# Patient Record
Sex: FEMALE | Race: WHITE | NOT HISPANIC OR LATINO | Employment: STUDENT | URBAN - METROPOLITAN AREA
[De-identification: names, ages, dates, MRNs, and addresses within clinical notes are randomized per-mention and may not be internally consistent; named-entity substitution may affect disease eponyms.]

---
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
## 2022-06-02 ENCOUNTER — VIRTUAL VISIT (OUTPATIENT)
Dept: FAMILY MEDICINE | Facility: CLINIC | Age: 22
End: 2022-06-02
Payer: COMMERCIAL

## 2022-06-02 DIAGNOSIS — N94.6 DYSMENORRHEA: ICD-10-CM

## 2022-06-02 DIAGNOSIS — F43.23 ADJUSTMENT DISORDER WITH MIXED ANXIETY AND DEPRESSED MOOD: Primary | ICD-10-CM

## 2022-06-02 PROCEDURE — 99213 OFFICE O/P EST LOW 20 MIN: CPT | Mod: 95 | Performed by: FAMILY MEDICINE

## 2022-06-02 RX ORDER — LEVONORGESTREL/ETHIN.ESTRADIOL 0.1-0.02MG
1 TABLET ORAL DAILY
Qty: 84 TABLET | Refills: 3 | Status: SHIPPED | OUTPATIENT
Start: 2022-06-02 | End: 2023-06-06

## 2022-06-02 ASSESSMENT — ANXIETY QUESTIONNAIRES
4. TROUBLE RELAXING: NOT AT ALL
1. FEELING NERVOUS, ANXIOUS, OR ON EDGE: NOT AT ALL
2. NOT BEING ABLE TO STOP OR CONTROL WORRYING: NOT AT ALL
GAD7 TOTAL SCORE: 0
3. WORRYING TOO MUCH ABOUT DIFFERENT THINGS: NOT AT ALL
7. FEELING AFRAID AS IF SOMETHING AWFUL MIGHT HAPPEN: NOT AT ALL
5. BEING SO RESTLESS THAT IT IS HARD TO SIT STILL: NOT AT ALL
6. BECOMING EASILY ANNOYED OR IRRITABLE: NOT AT ALL
GAD7 TOTAL SCORE: 0
GAD7 TOTAL SCORE: 0
7. FEELING AFRAID AS IF SOMETHING AWFUL MIGHT HAPPEN: NOT AT ALL
8. IF YOU CHECKED OFF ANY PROBLEMS, HOW DIFFICULT HAVE THESE MADE IT FOR YOU TO DO YOUR WORK, TAKE CARE OF THINGS AT HOME, OR GET ALONG WITH OTHER PEOPLE?: NOT DIFFICULT AT ALL

## 2022-06-02 ASSESSMENT — PATIENT HEALTH QUESTIONNAIRE - PHQ9
SUM OF ALL RESPONSES TO PHQ QUESTIONS 1-9: 1
10. IF YOU CHECKED OFF ANY PROBLEMS, HOW DIFFICULT HAVE THESE PROBLEMS MADE IT FOR YOU TO DO YOUR WORK, TAKE CARE OF THINGS AT HOME, OR GET ALONG WITH OTHER PEOPLE: NOT DIFFICULT AT ALL
SUM OF ALL RESPONSES TO PHQ QUESTIONS 1-9: 1

## 2022-06-02 NOTE — PROGRESS NOTES
Georgia is a 21 year old who is being evaluated via a billable video visit.      How would you like to obtain your AVS? MyChart  If the video visit is dropped, the invitation should be resent by: Text to cell phone: [phone]  Will anyone else be joining your video visit? No       Video Start Time: 9:58 AM    Assessment & Plan     Adjustment disorder with mixed anxiety and depressed mood - stable, refills  - FLUoxetine (PROZAC) 20 MG capsule; Take 1 capsule (20 mg) by mouth daily    Dysmenorrhea - stable, refills  - levonorgestrel-ethinyl estradiol (AVIANE) 0.1-20 MG-MCG tablet; Take 1 tablet by mouth daily    Return in about 1 year (around 6/2/2023) for Medication Recheck, Yearly Preventive Exam.    Jazz Zuniga MD  [facility]   Georgia is a 21 year old who presents for the following health issues     Contraception    History of Present Illness       Reason for visit:  Renew medication    She eats 2-3 servings of fruits and vegetables daily.She consumes 0 sweetened beverage(s) daily.She exercises with enough effort to increase her heart rate 20 to 29 minutes per day.  She exercises with enough effort to increase her heart rate 5 days per week.   She is taking medications regularly.    Today's PHQ-9         PHQ-9 Total Score: 1    PHQ-9 Q9 Thoughts of better off dead/self-harm past 2 weeks :   Not at all    How difficult have these problems made it for you to do your work, take care of things at home, or get along with other people: Not difficult at all  Today's SIRIA-7 Score: 0       Depression Followup    How are you doing with your depression since your last visit? Improved     Are you having other symptoms that might be associated with depression? No    Have you had a significant life event?  No     Are you feeling anxious or having panic attacks?   No    Do you have any concerns with your use of alcohol or other drugs? No    Social History     Tobacco Use      Smoking status: Never Smoker     Smokeless tobacco: Never Used     Tobacco comment: no smokers in the home   Vaping Use     Vaping Use: Never used   Substance Use Topics     Alcohol use: No     Drug use: No     PHQ 6/18/2019 3/9/2020 6/2/2022   PHQ-9 Total Score - 6 1   Q9: Thoughts of better off dead/self-harm past 2 weeks - Not at all Not at all   PHQ-A Total Score 9 - -   PHQ-A Depressed most days in past year Yes - -   PHQ-A Mood affect on daily activities Somewhat difficult - -   PHQ-A Suicide Ideation past 2 weeks Not at all - -   PHQ-A Suicide Ideation past month No - -   PHQ-A Previous suicide attempt No - -     SIRIA-7 SCORE 4/4/2019 3/9/2020 6/2/2022   Total Score - - 0 (minimal anxiety)   Total Score 12 4 0     Last PHQ-9 6/2/2022   1.  Little interest or pleasure in doing things 0   2.  Feeling down, depressed, or hopeless 0   3.  Trouble falling or staying asleep, or sleeping too much 0   4.  Feeling tired or having little energy 0   5.  Poor appetite or overeating 0   6.  Feeling bad about yourself 0   7.  Trouble concentrating 1   8.  Moving slowly or restless 0   Q9: Thoughts of better off dead/self-harm past 2 weeks 0   PHQ-9 Total Score 1   Difficulty at work, home, or with people -     SIRIA-7  6/2/2022   1. Feeling nervous, anxious, or on edge 0   2. Not being able to stop or control worrying 0   3. Worrying too much about different things 0   4. Trouble relaxing 0   5. Being so restless that it is hard to sit still 0   6. Becoming easily annoyed or irritable 0   7. Feeling afraid, as if something awful might happen 0   SIRIA-7 Total Score 0   If you checked any problems, how difficult have they made it for you to do your work, take care of things at home, or get along with other people? -       On Prozac 20 mg daily. Working well for her, would like to continue.     Also taking OCP, working fine, would like to continue.       Review of Systems   Constitutional, HEENT, cardiovascular, pulmonary, gi and  "gu systems are negative, except as otherwise noted.      Objective    Vitals - Patient Reported  Weight (Patient Reported): 73.9 kg (163 lb)  Height (Patient Reported): 177.8 cm (5' 10\")  BMI (Based on Pt Reported Ht/Wt): 23.39  Vitals:  No vitals were obtained today due to virtual visit.    Physical Exam   GENERAL: Healthy, alert and no distress  EYES: Eyes grossly normal to inspection.  No discharge or erythema, or obvious scleral/conjunctival abnormalities.  RESP: No audible wheeze, cough, or visible cyanosis.  No visible retractions or increased work of breathing.    SKIN: Visible skin clear. No significant rash, abnormal pigmentation or lesions.  NEURO: Cranial nerves grossly intact.  Mentation and speech appropriate for age.  PSYCH: Mentation appears normal, affect normal/bright, judgement and insight intact, normal speech and appearance well-groomed.    PHQ 6/18/2019 3/9/2020 6/2/2022   PHQ-9 Total Score - 6 1   Q9: Thoughts of better off dead/self-harm past 2 weeks - Not at all Not at all   PHQ-A Total Score 9 - -   PHQ-A Depressed most days in past year Yes - -   PHQ-A Mood affect on daily activities Somewhat difficult - -   PHQ-A Suicide Ideation past 2 weeks Not at all - -   PHQ-A Suicide Ideation past month No - -   PHQ-A Previous suicide attempt No - -     SIRIA-7 SCORE 4/4/2019 3/9/2020 6/2/2022   Total Score - - 0 (minimal anxiety)   Total Score 12 4 0           Video-Visit Details    Type of service:  Video Visit    Video End Time:10:01 AM    Originating Location (pt. Location): Home    Distant Location (provider location):  [facility]     Platform used for Video Visit: Jona"

## 2022-08-21 ENCOUNTER — HEALTH MAINTENANCE LETTER (OUTPATIENT)
Age: 22
End: 2022-08-21

## 2022-11-20 ENCOUNTER — HEALTH MAINTENANCE LETTER (OUTPATIENT)
Age: 22
End: 2022-11-20

## 2022-12-15 ENCOUNTER — TELEPHONE (OUTPATIENT)
Dept: FAMILY MEDICINE | Facility: CLINIC | Age: 22
End: 2022-12-15

## 2022-12-15 NOTE — TELEPHONE ENCOUNTER
Patient Quality Outreach    Patient is due for the following:   Physical Preventive Adult Physical  Chlamydia Screening      Topic Date Due     COVID-19 Vaccine (4 - Booster for Pfizer series) 12/15/2021     Flu Vaccine (1) 09/01/2022       Next Steps:   Schedule a Adult Preventative    Type of outreach:    Phone, left message for patient/parent to call back. and Sent letter.    Next Steps:  Reach out within 90 days via Phone and Letter.    Max number of attempts reached: Yes. Will try again in 90 days if patient still on fail list.    Questions for provider review:    None     Damaris Roberson MA  Chart routed to Care Team.

## 2022-12-15 NOTE — LETTER
[facility]  [address] [facility] [address]  [phone]  December 15, 2022    Georgia Lezama  [address]  [facility] [address]    Dear Georgia,    I care about your health and have reviewed your health plan. I have reviewed your medical conditions, medication list, and lab results and am making recommendations based on this review, to better manage your health.    You are in particular need of attention regarding:  -Wellness (Physical) Visit   -Chlamydia Screening    I am recommending that you:  {recommendations:-schedule a WELLNESS (Physical) APPOINTMENT with me.   I will check fasting labs the same day - nothing to eat except water and meds for 8-10 hours prior.    Here is a list of Health Maintenance topics that are due now or due soon:  Health Maintenance Due   Topic Date Due     CHLAMYDIA SCREENING  Never done     COVID-19 Vaccine (4 - Booster for Pfizer series) 12/15/2021     YEARLY PREVENTIVE VISIT  06/04/2022     ANNUAL REVIEW OF HM ORDERS  06/04/2022     INFLUENZA VACCINE (1) 09/01/2022       Please call us at [phone] (or use [employer]) to address the above recommendations.     Thank you for trusting [facility] and we appreciate the opportunity to serve you.  We look forward to supporting your healthcare needs in the future.    Healthy Regards,    [facility] Care Team

## 2023-05-05 ENCOUNTER — TELEPHONE (OUTPATIENT)
Dept: FAMILY MEDICINE | Facility: CLINIC | Age: 23
End: 2023-05-05
Payer: COMMERCIAL

## 2023-05-05 NOTE — TELEPHONE ENCOUNTER
Summary:    Patient is due/failing the following:   Chlamydia screening    Reviewed:    [] CARE EVERYWHERE  [] LAST OV NOTE   [] FYI TAB  [] MYCHART ACTIVE?  [] LAST PANEL ENCOUNTER  [] FUTURE APPTS  [] IMMUNIZATIONS  [] Media Tab            Action needed:   Patient needs non-fasting lab only appointment    Type of outreach:    per chart declined screening                                                                                Lulu Hanks/YADI  [place]---[facility]

## 2023-06-06 DIAGNOSIS — N94.6 DYSMENORRHEA: ICD-10-CM

## 2023-06-06 RX ORDER — LEVONORGESTREL/ETHIN.ESTRADIOL 0.1-0.02MG
1 TABLET ORAL DAILY
Qty: 30 TABLET | Refills: 0 | Status: SHIPPED | OUTPATIENT
Start: 2023-06-06 | End: 2023-06-22

## 2023-06-06 NOTE — TELEPHONE ENCOUNTER
Routing refill request to provider for review/approval because:  Allergy warning pt has OV with OB next month ok for 1 X fill?  Ainsley Guadarrama, RN

## 2023-06-14 DIAGNOSIS — F43.23 ADJUSTMENT DISORDER WITH MIXED ANXIETY AND DEPRESSED MOOD: ICD-10-CM

## 2023-06-14 NOTE — TELEPHONE ENCOUNTER
Routing refill request to provider for review/approval because:  Labs not current:    Allergy to red dye    PHQ-9 score:        6/2/2022     9:26 AM   PHQ   PHQ-9 Total Score 1   Q9: Thoughts of better off dead/self-harm past 2 weeks Not at all     Team, please call and schedule pt for yearly OV with provider.    Olivia BRITO RN

## 2023-06-22 ENCOUNTER — OFFICE VISIT (OUTPATIENT)
Dept: OBGYN | Facility: CLINIC | Age: 23
End: 2023-06-22
Payer: COMMERCIAL

## 2023-06-22 VITALS
HEIGHT: 70 IN | WEIGHT: 182.5 LBS | BODY MASS INDEX: 26.13 KG/M2 | DIASTOLIC BLOOD PRESSURE: 60 MMHG | SYSTOLIC BLOOD PRESSURE: 110 MMHG

## 2023-06-22 DIAGNOSIS — N94.6 DYSMENORRHEA: ICD-10-CM

## 2023-06-22 DIAGNOSIS — Z12.4 PAPANICOLAOU SMEAR FOR CERVICAL CANCER SCREENING: ICD-10-CM

## 2023-06-22 DIAGNOSIS — F43.23 ADJUSTMENT DISORDER WITH MIXED ANXIETY AND DEPRESSED MOOD: ICD-10-CM

## 2023-06-22 DIAGNOSIS — N89.8 VAGINAL DISCHARGE: Primary | ICD-10-CM

## 2023-06-22 LAB
CLUE CELLS: ABNORMAL
TRICHOMONAS, WET PREP: ABNORMAL
WBC'S/HIGH POWER FIELD, WET PREP: ABNORMAL
YEAST, WET PREP: ABNORMAL

## 2023-06-22 PROCEDURE — 99385 PREV VISIT NEW AGE 18-39: CPT | Performed by: FAMILY MEDICINE

## 2023-06-22 PROCEDURE — 87210 SMEAR WET MOUNT SALINE/INK: CPT | Performed by: FAMILY MEDICINE

## 2023-06-22 PROCEDURE — G0145 SCR C/V CYTO,THINLAYER,RESCR: HCPCS | Performed by: FAMILY MEDICINE

## 2023-06-22 RX ORDER — LEVONORGESTREL/ETHIN.ESTRADIOL 0.1-0.02MG
1 TABLET ORAL DAILY
Qty: 30 TABLET | Refills: 3 | Status: SHIPPED | OUTPATIENT
Start: 2023-06-22 | End: 2023-08-16

## 2023-06-22 NOTE — PATIENT INSTRUCTIONS
Return yearly   If you continue to have pain, I can refer you to physical therapy  Many women are finding vaginal dilators to be an effective medical aid in helping them overcome sexual problems, such as, vaginal atrophy, painful intercourse, and fear of penetration. These durable plastic, tube-shaped devices come in four progressive sizes: extra-small, small, medium and large. They allow you to slowly stretch the muscles in your vagina and desensitize your emotional and physical reactions. Over time you can build confidence about your ability to enjoy vaginal penetration during lovemaking.  Here are some general suggestions for how to use vaginal dilators in a sexual healing program. Consult with your physician or sex therapist to make sure that these suggestions conform with the individual treatment program recommended for you.  Suggestions for inserting vaginal dilators:  Wash the smallest size dilator with soap and hot water. Then place it in a glass of hot water by your bedside to keep it warm. Take a hot, relaxing bath. Dry off and sit back on your bed, reclining on soft pillows.   Take the warm dilator, dry it off, and cover the tip with a personal lubricant. Place a large dab of lubricant over your vaginal opening.   Relax, breathe fully and steadily. Do a few Kegel exercises to tighten and relax your vaginal opening. When you feel ready, slowly insert the tip of the dilator into the opening of your vagina. Angle the tip downwards, towards your tailbone, as this will help steer the dilator comfortably in, under and around your pubic bone. Insert the dilator only as far as you want to, then rest and continue with relaxation techniques to keep yourself calm and your vaginal muscles loose.   Work towards being able to insert the dilator 3-4 inches inside you and rest it there for about 20 minutes a day, every day. The more regularly and routinely you do the insertion, the easier it will become. Do not try to  push the dilator in all the way. It is designed so that part of it will stick out so you can easily hold on to it.   Once you are comfortable with resting the smallest size dilator inside you, experiment with moving it around. Remember, the dilator is helping you slowly stretch the inner muscles of your vagina. Move the dilator up and down, back and forth.   Repeat the exercise with the smallest size dilator for at least one week. When you feel ready, progress to the next size dilator. Stay with one size dilator as long as you need to until you feel relaxed and comfortable with insertion and movement. You master each size before moving on to the next.   Continue the exercises until you have mastered the largest size dilator. Then, you can repeat the exercises guiding your partner s hand as your partner inserts the dilators and moves them around. Eventually, with your partner s cooperation, you can continue the exercises using your partner s penis, or fingers.   Using vaginal dilators takes time and commitment. To protect against boredom, feel free to combine dilator insertion with another relaxing activity, such as, reading a book, watching TV, or listening to music. If you can only find 10 minutes a day to do the exercises, that s better than nothing. If you skip a day or several days, just start in again as soon as possible. One woman likened the process of treatment with a dilator to using a shoe stretcher to increase the size of tight shoes -- with either one, you need to get it in and give it plenty of time to work.  In using vaginal dilators, remember that you can control the process. You determine when, where, how, and for how long you will do an exercise. To facilitate transitions, you may want to shift down to an easier size for a longer period of time, or shift from one size to another slowly during the same 20 minute period. Treatment with vaginal dilators usually takes several months if a woman is using  the dilators daily. Follow-up insertion on a less frequent basis may be necessary to keep the vaginal muscles loosened.  Sexual relations:  It is best to avoid sexual activity that involves any type of vaginal penetration during the dilator treatment period. Non-vaginal penetration sexual activities, such as oral sex and manual stimulation, are fine.  The transaction from plastic dilators to a partner s penis is often an exciting step for a couple. To make the transition, your partner has to learn a passive role, letting you control the insertion and then just resting inside the vagina for a while. In time you can expand this exercise to permit insertion by the male of his own penis, clitoral stimulation, some thrusting, and experimentation with different position.  Vaginal dilators allow you to get used to sensations common in intercourse. Keep in mind that intercourse is not always 100% comfortable. Little temporary tugs and pressures are often just part of getting started. If some minor discomfort exists, try moving ahead anyway -- but if obvious pain persists, don t ignore it , stop. If you encounter unexpected difficulty, you may want to practice with the dilators some more before attempting intercourse again. Continued dilator use may be necessary, from time to time, to keep the vaginal area relaxed and comfortable.  Vaginal dilators can be very successful in helping women overcome penetration problems, but if old symptoms reoccur, don t hesitate to consult your physician.

## 2023-06-22 NOTE — PROGRESS NOTES
SUBJECTIVE:  Georgia Lezama is an 22 year old  woman who presents for   annual gyn exam. Patient's last menstrual period was 2023 (exact date). Periods are regular q 28-30 days, lasting   3-4 days. Dysmenorrhea:mild, occurring premenstrually and first 1-2 days of flow. Cyclic symptoms   include none. No intermenstrual bleeding,   spotting, or discharge.  Menarche age teenager.  STD hx: none.    Current contraception: oral contraceptives  LUIS exposure: no  History of abnormal Pap smear: No  Family history of uterine or ovarian cancer: No  Regular self breast exam: Yes  History of abnormal mammogram: No  Family history of breast cancer: No  History of abnormal lipids: No    Past Medical History:   Diagnosis Date     Congenital branchial cleft cyst     s/p removal in         Family History   Problem Relation Age of Onset     Family History Negative Mother      Family History Negative Father      Family History Negative Brother         1     Prostate Cancer Maternal Grandfather         hyper tension     Breast Cancer Paternal Grandmother         uterine     Diabetes Maternal Aunt      Diabetes Other         second cousin       Past Surgical History:   Procedure Laterality Date     LAPAROSCOPIC APPENDECTOMY CHILD  3/16/2012    Procedure:LAPAROSCOPIC APPENDECTOMY CHILD; Surgeon:ROGELIO STARK; Location:[facility]     wisdom teeth       ZZHC EXCISION BRACH CLFT CYST,SUPERFICIAL         Current Outpatient Medications   Medication     FLUoxetine (PROZAC) 20 MG capsule     lactobacillus rhamnosus, GG, (CULTURELL) capsule     levonorgestrel-ethinyl estradiol (AVIANE) 0.1-20 MG-MCG tablet     Multiple Vitamin (MULTI-VITAMIN) per tablet     No current facility-administered medications for this visit.     Allergies   Allergen Reactions     Red Dye      Nausea, stomach issues       Social History     Tobacco Use     Smoking status: Never     Smokeless tobacco: Never     Tobacco comments:     no smokers in  "the home   Substance Use Topics     Alcohol use: No       Review Of Systems  Ears/Nose/Throat: negative  Respiratory: No shortness of breath, dyspnea on exertion, cough, or hemoptysis  Cardiovascular: negative  Gastrointestinal: negative  Genitourinary: See HPI   Constitutional, HEENT, cardiovascular, pulmonary, GI, , musculoskeletal, neuro, skin, endocrine and psych systems are negative, except as otherwise noted.      OBJECTIVE:  /60   Ht 1.778 m (5' 10\")   Wt 82.8 kg (182 lb 8 oz)   LMP 2023 (Exact Date)   BMI 26.19 kg/m      General appearance: healthy, alert and no distress  Skin: Skin color, texture, turgor normal. No rashes or lesions.  Ears: negative  Nose/Sinuses: Nares normal. Septum midline. Mucosa normal. No drainage or sinus tenderness.  Oropharynx: Lips, mucosa, and tongue normal. Teeth and gums normal.  Neck: Neck supple. No adenopathy. Thyroid symmetric, normal size,, Carotids without bruits.  Lungs: negative, Percussion normal. Good diaphragmatic excursion. Lungs clear  Heart: negative, PMI normal. No lifts, heaves, or thrills. RRR. No murmurs, clicks gallops or rub  Breasts: Inspection negative. No nipple discharge or bleeding. No masses.  Abdomen: Abdomen soft, non-tender. BS normal. No masses, organomegaly  Pelvic: Pelvic:  Pelvic examination with no pap/no Gonorrhea and Chlamydia /wet prep   including  External genitalia normal   and vagina normal rugatted not atrophic  Examination of urethra  normal no masses, tenderness, scarring  bladder, no masses or tenderness  Cervix  no lesions or discharge    Bimanual exam with   Uterus 6 weeks size, mid position, mobile,non-tenderness, normal no descent   Adnexa/parametria  Normal no masses       ASSESSMENT:  Georgia Lezama is an 22 year old  woman who presents for   annual gyn exam.     PLAN:  Dx:  1)  Pap smear completed, as she is 23 in 7 weeks   Gonorrhea  Chlamydia declines   2)  Mammography, lipids at appropriate " intervals not due   Colonoscopy not due  3)  Covid-19 concerns: covid infection and vaccination recommendations discussed.   4)  Contraception: for dysmenorrhea, refilled Oral Contraceptive   5)  Vaginal exam with no discreet vaginismus, suspect smaller anatomy which caused pain with diva cup and may benefit from dilator therapy  For use of diva cup or disc or physical therapy.      PE:  Reviewed health maintenance including diet, regular exercise   and periodic exams.    Dr. Saritha Cantrell, DO    Obstetrics and Gynecology  [facility] [place] [facility] [place]

## 2023-06-22 NOTE — NURSING NOTE
"Chief Complaint   Patient presents with     Gyn Exam     Needs Rx for OCP--using for cramps--potential vaginismus--wonders how to stop OCP--tried Diva and it was very painful--can use regular or smaller tampons       Initial /60   Ht 1.778 m (5' 10\")   Wt 82.8 kg (182 lb 8 oz)   LMP 2023 (Exact Date)   BMI 26.19 kg/m   Estimated body mass index is 26.19 kg/m  as calculated from the following:    Height as of this encounter: 1.778 m (5' 10\").    Weight as of this encounter: 82.8 kg (182 lb 8 oz).  BP completed using cuff size: regular    Questioned patient about current smoking habits.  Pt. has never smoked.          The following HM Due: NONE    "

## 2023-06-27 LAB
BKR LAB AP GYN ADEQUACY: NORMAL
BKR LAB AP GYN INTERPRETATION: NORMAL
BKR LAB AP HPV REFLEX: NO
BKR LAB AP LMP: NORMAL
BKR LAB AP PREVIOUS ABNORMAL: NORMAL
PATH REPORT.COMMENTS IMP SPEC: NORMAL
PATH REPORT.COMMENTS IMP SPEC: NORMAL
PATH REPORT.RELEVANT HX SPEC: NORMAL

## 2023-08-16 DIAGNOSIS — N94.6 DYSMENORRHEA: ICD-10-CM

## 2023-08-16 RX ORDER — LEVONORGESTREL/ETHIN.ESTRADIOL 0.1-0.02MG
1 TABLET ORAL DAILY
Qty: 84 TABLET | Refills: 3 | Status: SHIPPED | OUTPATIENT
Start: 2023-08-16 | End: 2024-09-17

## 2023-08-16 NOTE — TELEPHONE ENCOUNTER
Levonorgestrel-Ethinyl Estrad Oral Tablet    Last Written Prescription Date:  6/22/2023   Last Fill Quantity: 30 Tablets,   # refills: 3   Last Office Visit: 6/22/2023    Future Office visit:  NA      Pt is going to college and would like a 90 day supply with refills       Cathryn Dunham, YADI on 8/16/2023 at 12:06 PM

## 2023-08-23 ENCOUNTER — TELEPHONE (OUTPATIENT)
Dept: FAMILY MEDICINE | Facility: CLINIC | Age: 23
End: 2023-08-23
Payer: COMMERCIAL

## 2023-08-23 NOTE — TELEPHONE ENCOUNTER
Dr. Zuniga has received form for YONATHAN. Dr. Zuniga is asking patient to schedule a virtual visit to complete this. Left message to call back.  Mae Amaya,

## 2023-09-01 ASSESSMENT — ANXIETY QUESTIONNAIRES
6. BECOMING EASILY ANNOYED OR IRRITABLE: NOT AT ALL
4. TROUBLE RELAXING: SEVERAL DAYS
GAD7 TOTAL SCORE: 6
7. FEELING AFRAID AS IF SOMETHING AWFUL MIGHT HAPPEN: MORE THAN HALF THE DAYS
3. WORRYING TOO MUCH ABOUT DIFFERENT THINGS: SEVERAL DAYS
IF YOU CHECKED OFF ANY PROBLEMS ON THIS QUESTIONNAIRE, HOW DIFFICULT HAVE THESE PROBLEMS MADE IT FOR YOU TO DO YOUR WORK, TAKE CARE OF THINGS AT HOME, OR GET ALONG WITH OTHER PEOPLE: SOMEWHAT DIFFICULT
1. FEELING NERVOUS, ANXIOUS, OR ON EDGE: MORE THAN HALF THE DAYS
5. BEING SO RESTLESS THAT IT IS HARD TO SIT STILL: NOT AT ALL
2. NOT BEING ABLE TO STOP OR CONTROL WORRYING: NOT AT ALL
GAD7 TOTAL SCORE: 6

## 2023-09-07 ENCOUNTER — VIRTUAL VISIT (OUTPATIENT)
Dept: FAMILY MEDICINE | Facility: CLINIC | Age: 23
End: 2023-09-07
Payer: COMMERCIAL

## 2023-09-07 DIAGNOSIS — F43.23 ADJUSTMENT DISORDER WITH MIXED ANXIETY AND DEPRESSED MOOD: ICD-10-CM

## 2023-09-07 DIAGNOSIS — Z02.89 ENCOUNTER FOR COMPLETION OF FORM WITH PATIENT: Primary | ICD-10-CM

## 2023-09-07 PROCEDURE — 99213 OFFICE O/P EST LOW 20 MIN: CPT | Mod: VID | Performed by: FAMILY MEDICINE

## 2023-09-07 NOTE — PROGRESS NOTES
Georgia is a 23 year old who is being evaluated via a billable video visit.      How would you like to obtain your AVS? MyChart  If the video visit is dropped, the invitation should be resent by: Text to cell phone: [phone]  Will anyone else be joining your video visit? No      Assessment & Plan     Encounter for completion of form with patient - YONATHAN paperwork will be completed    Adjustment disorder with mixed anxiety and depressed mood     Jazz Zuniga MD  [facility]      Lincoln Ho is a 23 year old, presenting for the following health issues:  Forms (Form for 'YONATHAN)      9/7/2023     7:38 AM   Additional Questions   Roomed by Lulu Hanks       History of Present Illness       Mental Health Follow-up:  Patient presents to follow-up on Depression & Anxiety.Patient's depression since last visit has been:  Medium  The patient is not having other symptoms associated with depression.  Patient's anxiety since last visit has been:  Medium  The patient is not having other symptoms associated with anxiety.  Any significant life events: No  Patient is not feeling anxious or having panic attacks.  Patient has no concerns about alcohol or drug use.    She eats 2-3 servings of fruits and vegetables daily.She consumes 1 sweetened beverage(s) daily.She exercises with enough effort to increase her heart rate 10 to 19 minutes per day.  She exercises with enough effort to increase her heart rate 5 days per week.   She is taking medications regularly.       Would like her dog as an YONATHAN.   In college, in [employer].   Dog is Bandit.   Reports she feels safer having her dog with her.   Reports her dog gives her comfort when she is feeling depressed.   Regular walks help with her depression symptoms.       Review of Systems   Constitutional, HEENT, cardiovascular, pulmonary, gi and gu systems are negative, except as otherwise noted.      Objective    Vitals - Patient Reported  Weight  "(Patient Reported): 81.6 kg (180 lb)  Height (Patient Reported): 177.8 cm (5' 10\")  BMI (Based on Pt Reported Ht/Wt): 25.83    Physical Exam   GENERAL: Healthy, alert and no distress  EYES: Eyes grossly normal to inspection.  No discharge or erythema, or obvious scleral/conjunctival abnormalities.  RESP: No audible wheeze, cough, or visible cyanosis.  No visible retractions or increased work of breathing.    SKIN: Visible skin clear. No significant rash, abnormal pigmentation or lesions.  NEURO: Cranial nerves grossly intact.  Mentation and speech appropriate for age.  PSYCH: Mentation appears normal, affect normal/bright, judgement and insight intact, normal speech and appearance well-groomed.        Video-Visit Details    Type of service:  Video Visit     Originating Location (pt. Location): Home    Distant Location (provider location):  Off-site  Platform used for Video Visit: Jona"

## 2023-09-07 NOTE — LETTER
September 12, 2023      Georgia Lezama  [id number] [address] [facility] [address]        To Whom It May Concern,        This letter is in support of an emotional support animal for Georgia Lezama.     Based on my determination, Georgia meets criteria in the Americans with Disabilities Act and would benefit from having an emotional support animal to help her better cope with her emotional disability.     I have recommended that Georgia have her dog, Mei, live with her in her campus housing. Bandit offers Georgia comfort when she is feeling depressed or anxious.       Sincerely,    Jazz Zuniga MD

## 2023-09-18 NOTE — TELEPHONE ENCOUNTER
Spoke with patient, she asked for form to be faxed. Faxed. Copy in HOLD folder, original to[employer]IMS.  Mae Amaya,

## 2023-11-20 ENCOUNTER — TELEPHONE (OUTPATIENT)
Dept: FAMILY MEDICINE | Facility: CLINIC | Age: 23
End: 2023-11-20
Payer: COMMERCIAL

## 2023-11-20 NOTE — TELEPHONE ENCOUNTER
Summary:    Patient is due/failing the following:   Chlamydia screening    Reviewed:    [] CARE EVERYWHERE  [] LAST OV NOTE   [] FYI TAB  [] MYCHART ACTIVE?  [] LAST PANEL ENCOUNTER  [] FUTURE APPTS  [] IMMUNIZATIONS  [] Media Tab            Action needed:   Patient needs non-fasting lab only appointment    Type of outreach:    Sent [place] message.                                                                               Lulu Hanks/YADI  [place]---[facility]

## 2024-07-25 ENCOUNTER — TELEPHONE (OUTPATIENT)
Dept: FAMILY MEDICINE | Facility: CLINIC | Age: 24
End: 2024-07-25
Payer: COMMERCIAL

## 2024-07-25 NOTE — TELEPHONE ENCOUNTER
Patient Quality Outreach    Patient is due for the following:   Chlamydia Screening    Next Steps:   Patient has upcoming appointment, these items will be addressed at that time.    Type of outreach:    Chart review performed, no outreach needed.    Next Steps:  Reach out within 90 days via [employer].    Max number of attempts reached: No. Will try again in 90 days if patient still on fail list.    Questions for provider review:    None           Effie Vega CMA  Chart routed to Care Team.

## 2024-08-31 ENCOUNTER — HEALTH MAINTENANCE LETTER (OUTPATIENT)
Age: 24
End: 2024-08-31

## 2024-09-17 ENCOUNTER — OFFICE VISIT (OUTPATIENT)
Dept: FAMILY MEDICINE | Facility: CLINIC | Age: 24
End: 2024-09-17
Payer: COMMERCIAL

## 2024-09-17 VITALS
OXYGEN SATURATION: 97 % | HEIGHT: 70 IN | DIASTOLIC BLOOD PRESSURE: 75 MMHG | TEMPERATURE: 99.1 F | WEIGHT: 182 LBS | RESPIRATION RATE: 24 BRPM | SYSTOLIC BLOOD PRESSURE: 110 MMHG | BODY MASS INDEX: 26.05 KG/M2 | HEART RATE: 75 BPM

## 2024-09-17 DIAGNOSIS — F43.23 ADJUSTMENT DISORDER WITH MIXED ANXIETY AND DEPRESSED MOOD: ICD-10-CM

## 2024-09-17 DIAGNOSIS — Z00.00 ROUTINE GENERAL MEDICAL EXAMINATION AT A HEALTH CARE FACILITY: Primary | ICD-10-CM

## 2024-09-17 DIAGNOSIS — N94.6 DYSMENORRHEA: ICD-10-CM

## 2024-09-17 PROCEDURE — 90715 TDAP VACCINE 7 YRS/> IM: CPT | Performed by: PHYSICIAN ASSISTANT

## 2024-09-17 PROCEDURE — 99214 OFFICE O/P EST MOD 30 MIN: CPT | Mod: 25 | Performed by: PHYSICIAN ASSISTANT

## 2024-09-17 PROCEDURE — 90471 IMMUNIZATION ADMIN: CPT | Performed by: PHYSICIAN ASSISTANT

## 2024-09-17 PROCEDURE — 99395 PREV VISIT EST AGE 18-39: CPT | Mod: 25 | Performed by: PHYSICIAN ASSISTANT

## 2024-09-17 RX ORDER — LEVONORGESTREL/ETHIN.ESTRADIOL 0.1-0.02MG
1 TABLET ORAL DAILY
Qty: 84 TABLET | Refills: 3 | Status: SHIPPED | OUTPATIENT
Start: 2024-09-17

## 2024-09-17 ASSESSMENT — PAIN SCALES - GENERAL: PAINLEVEL: NO PAIN (0)

## 2024-09-17 NOTE — PROGRESS NOTES
"Preventive Care Visit  [facility]  Rochelle Ann Aaseby-Aguilera, PA-C, Family Medicine  Sep 17, 2024      Assessment & Plan     Routine general medical examination at a health care facility  Age and gender appropriate preventive care and screenings are discussed.  Particular attention to personal preventive care and age appropriate lifestyle including the incorporation of healthy diet and physical activity is made       Adjustment disorder with mixed anxiety and depressed mood  Stable   - FLUoxetine (PROZAC) 20 MG capsule; Take 1 capsule (20 mg) by mouth daily.    Dysmenorrhea  Stable   - levonorgestrel-ethinyl estradiol (AVIANE) 0.1-20 MG-MCG tablet; Take 1 tablet by mouth daily.                BMI  Estimated body mass index is 26.11 kg/m  as calculated from the following:    Height as of this encounter: 1.778 m (5' 10\").    Weight as of this encounter: 82.6 kg (182 lb).       Counseling  Appropriate preventive services were addressed with this patient via screening, questionnaire, or discussion as appropriate for fall prevention, nutrition, physical activity, Tobacco-use cessation, social engagement, weight loss and cognition.  Checklist reviewing preventive services available has been given to the patient.  Reviewed patient's diet, addressing concerns and/or questions.   She is at risk for psychosocial distress and has been provided with information to reduce risk.       See Patient Instructions    Lincoln Ho is a 24 year old, presenting for the following:  Physical (nonfasting)        9/17/2024     5:05 PM   Additional Questions   Roomed by Allan   Accompanied by self        Health Care Directive  Patient does not have a Health Care Directive or Living Will: Discussed advance care planning with patient; information given to patient to review.    HPI              9/17/2024   General Health   How would you rate your overall physical health? Excellent   Feel stress (tense, " anxious, or unable to sleep) Only a little      (!) STRESS CONCERN      9/17/2024   Nutrition   Three or more servings of calcium each day? Yes   Diet: Regular (no restrictions)   How many servings of fruit and vegetables per day? (!) 2-3   How many sweetened beverages each day? 0-1            9/17/2024   Exercise   Days per week of moderate/strenous exercise 6 days   Average minutes spent exercising at this level 30 min            9/17/2024   Social Factors   Frequency of gathering with friends or relatives Once a week   Worry food won't last until get money to buy more No   Food not last or not have enough money for food? No   Do you have housing? (Housing is defined as stable permanent housing and does not include staying ouside in a car, in a tent, in an abandoned building, in an overnight shelter, or couch-surfing.) Yes   Are you worried about losing your housing? No   Lack of transportation? No   Unable to get utilities (heat,electricity)? No            9/17/2024   Dental   Dentist two times every year? Yes            9/17/2024   TB Screening   Were you born outside of the [place]? No            Today's PHQ-2 Score:       9/17/2024     4:54 PM   PHQ-2 ( 1999 [employer])   Q1: Little interest or pleasure in doing things 0   Q2: Feeling down, depressed or hopeless 0   PHQ-2 Score 0   Q1: Little interest or pleasure in doing things Not at all   Q2: Feeling down, depressed or hopeless Not at all   PHQ-2 Score 0           9/17/2024   Substance Use   Alcohol more than 3/day or more than 7/wk No   Do you use any other substances recreationally? No        Social History     Tobacco Use    Smoking status: Never     Passive exposure: Never    Smokeless tobacco: Never    Tobacco comments:     no smokers in the home   Vaping Use    Vaping status: Never Used   Substance Use Topics    Alcohol use: No    Drug use: No           9/17/2024   STI Screening   New sexual partner(s) since last STI/HIV test? No        History of abnormal Pap  smear: No - age 21-29 PAP every 3 years recommended        6/22/2023     3:56 PM   PAP / HPV   PAP Negative for Intraepithelial Lesion or Malignancy (NILM)            9/17/2024   Contraception/Family Planning   Questions about contraception or family planning No           Reviewed and updated as needed this visit by Provider                    BP Readings from Last 3 Encounters:   09/17/24 110/75   06/22/23 110/60   07/12/21 111/74    Wt Readings from Last 3 Encounters:   09/17/24 82.6 kg (182 lb)   06/22/23 82.8 kg (182 lb 8 oz)   07/12/21 66.3 kg (146 lb 1.6 oz) (79%, Z= 0.80)*     * Growth percentiles are based on [facility] (Girls, 2-20 Years) data.                  Patient Active Problem List   Diagnosis    Adjustment disorder with mixed anxiety and depressed mood    Dysmenorrhea     Past Surgical History:   Procedure Laterality Date    LAPAROSCOPIC APPENDECTOMY CHILD  3/16/2012    Procedure:LAPAROSCOPIC APPENDECTOMY CHILD; Surgeon:ROGELIO STARK; Location:[facility] OR    wisdom teeth      ZZHC EXCISION BRACH CLFT CYST,SUPERFICIAL  2003       Social History     Tobacco Use    Smoking status: Never     Passive exposure: Never    Smokeless tobacco: Never    Tobacco comments:     no smokers in the home   Substance Use Topics    Alcohol use: No     Family History   Problem Relation Age of Onset    Family History Negative Mother     Family History Negative Father     Family History Negative Brother         1    Prostate Cancer Maternal Grandfather         hyper tension    Breast Cancer Paternal Grandmother         uterine    Diabetes Maternal Aunt     Diabetes Other         second cousin         Current Outpatient Medications   Medication Sig Dispense Refill    FLUoxetine (PROZAC) 20 MG capsule Take 1 capsule (20 mg) by mouth daily. 90 capsule 3    lactobacillus rhamnosus, GG, (CULTURELL) capsule Take 1 capsule by mouth daily.      levonorgestrel-ethinyl estradiol (AVIANE) 0.1-20 MG-MCG tablet Take 1 tablet by mouth daily. 84  "tablet 3    Multiple Vitamin (MULTI-VITAMIN) per tablet Take 1 tablet by mouth daily.       Allergies   Allergen Reactions    Red Dye #40 (Allura Red)      Nausea, stomach issues     Recent Labs   Lab Test 06/04/21  0806   ALT 14   CR 0.91   GFRESTIMATED >90   GFRESTBLACK >90   POTASSIUM 4.5   TSH 2.15          Review of Systems  CONSTITUTIONAL: NEGATIVE for fever, chills, change in weight  INTEGUMENTARY/SKIN: NEGATIVE for worrisome rashes, moles or lesions  EYES: NEGATIVE for vision changes or irritation  ENT/MOUTH: NEGATIVE for ear, mouth and throat problems  RESP: NEGATIVE for significant cough or SOB  BREAST: NEGATIVE for masses, tenderness or discharge  CV: NEGATIVE for chest pain, palpitations or peripheral edema  GI: NEGATIVE for nausea, abdominal pain, heartburn, or change in bowel habits  : NEGATIVE for frequency, dysuria, or hematuria  MUSCULOSKELETAL: NEGATIVE for significant arthralgias or myalgia  NEURO: NEGATIVE for weakness, dizziness or paresthesias  ENDOCRINE: NEGATIVE for temperature intolerance, skin/hair changes  HEME: NEGATIVE for bleeding problems  PSYCHIATRIC: NEGATIVE for changes in mood or affect     Objective    Exam  /75 (BP Location: Right arm, Patient Position: Sitting, Cuff Size: Adult Large)   Pulse 75   Temp 99.1  F (37.3  C) (Oral)   Resp 24   Ht 1.778 m (5' 10\")   Wt 82.6 kg (182 lb)   LMP 09/02/2024 (Approximate)   SpO2 97%   BMI 26.11 kg/m     Estimated body mass index is 26.11 kg/m  as calculated from the following:    Height as of this encounter: 1.778 m (5' 10\").    Weight as of this encounter: 82.6 kg (182 lb).    Physical Exam  GENERAL: alert and no distress  EYES: Eyes grossly normal to inspection, PERRL and conjunctivae and sclerae normal  HENT: ear canals and TM's normal, nose and mouth without ulcers or lesions  NECK: no adenopathy, no asymmetry, masses, or scars  RESP: lungs clear to auscultation - no rales, rhonchi or wheezes  BREAST: normal without " masses, tenderness or nipple discharge and no palpable axillary masses or adenopathy  CV: regular rate and rhythm, normal S1 S2, no S3 or S4, no murmur, click or rub, no peripheral edema  ABDOMEN: soft, nontender, no hepatosplenomegaly, no masses and bowel sounds normal  MS: no gross musculoskeletal defects noted, no edema  SKIN: no suspicious lesions or rashes  NEURO: Normal strength and tone, mentation intact and speech normal  PSYCH: mentation appears normal, affect normal/bright        Signed Electronically by: Ramona Ann Aaseby-Aguilera, PA-C

## 2024-09-17 NOTE — PATIENT INSTRUCTIONS
Patient Education   Preventive Care Advice   This is general advice given by our system to help you stay healthy. However, your care team may have specific advice just for you. Please talk to your care team about your preventive care needs.  Nutrition  Eat 5 or more servings of fruits and vegetables each day.  Try wheat bread, brown rice and whole grain pasta (instead of white bread, rice, and pasta).  Get enough calcium and vitamin D. Check the label on foods and aim for 100% of the RDA (recommended daily allowance).  Lifestyle  Exercise at least 150 minutes each week  (30 minutes a day, 5 days a week).  Do muscle strengthening activities 2 days a week. These help control your weight and prevent disease.  No smoking.  Wear sunscreen to prevent skin cancer.  Have a dental exam and cleaning every 6 months.  Yearly exams  See your health care team every year to talk about:  Any changes in your health.  Any medicines your care team has prescribed.  Preventive care, family planning, and ways to prevent chronic diseases.  Shots (vaccines)   HPV shots (up to age 26), if you've never had them before.  Hepatitis B shots (up to age 59), if you've never had them before.  COVID-19 shot: Get this shot when it's due.  Flu shot: Get a flu shot every year.  Tetanus shot: Get a tetanus shot every 10 years.  Pneumococcal, hepatitis A, and RSV shots: Ask your care team if you need these based on your risk.  Shingles shot (for age 50 and up)  General health tests  Diabetes screening:  Starting at age 35, Get screened for diabetes at least every 3 years.  If you are younger than age 35, ask your care team if you should be screened for diabetes.  Cholesterol test: At age 39, start having a cholesterol test every 5 years, or more often if advised.  Bone density scan (DEXA): At age 50, ask your care team if you should have this scan for osteoporosis (brittle bones).  Hepatitis C: Get tested at least once in your life.  STIs (sexually  transmitted infections)  Before age 24: Ask your care team if you should be screened for STIs.  After age 24: Get screened for STIs if you're at risk. You are at risk for STIs (including HIV) if:  You are sexually active with more than one person.  You don't use condoms every time.  You or a partner was diagnosed with a sexually transmitted infection.  If you are at risk for HIV, ask about PrEP medicine to prevent HIV.  Get tested for HIV at least once in your life, whether you are at risk for HIV or not.  Cancer screening tests  Cervical cancer screening: If you have a cervix, begin getting regular cervical cancer screening tests starting at age 21.  Breast cancer scan (mammogram): If you've ever had breasts, begin having regular mammograms starting at age 40. This is a scan to check for breast cancer.  Colon cancer screening: It is important to start screening for colon cancer at age 45.  Have a colonoscopy test every 10 years (or more often if you're at risk) Or, ask your provider about stool tests like a FIT test every year or Cologuard test every 3 years.  To learn more about your testing options, visit:   .  For help making a decision, visit:   https://bit.ly/vr43821.  Prostate cancer screening test: If you have a prostate, ask your care team if a prostate cancer screening test (PSA) at age 55 is right for you.  Lung cancer screening: If you are a current or former smoker ages 50 to 80, ask your care team if ongoing lung cancer screenings are right for you.  For informational purposes only. Not to replace the advice of your health care provider. Copyright   2023 [employer]. All rights reserved. Clinically reviewed by the [facility] Transitions Program. [employer] [id number] - REV 01/24.

## 2025-08-18 ENCOUNTER — PATIENT OUTREACH (OUTPATIENT)
Dept: CARE COORDINATION | Facility: CLINIC | Age: 25
End: 2025-08-18
Payer: COMMERCIAL

## 2025-09-01 ENCOUNTER — PATIENT OUTREACH (OUTPATIENT)
Dept: CARE COORDINATION | Facility: CLINIC | Age: 25
End: 2025-09-01
Payer: COMMERCIAL